# Patient Record
Sex: MALE | Race: BLACK OR AFRICAN AMERICAN | NOT HISPANIC OR LATINO
[De-identification: names, ages, dates, MRNs, and addresses within clinical notes are randomized per-mention and may not be internally consistent; named-entity substitution may affect disease eponyms.]

---

## 2019-07-29 ENCOUNTER — TRANSCRIPTION ENCOUNTER (OUTPATIENT)
Age: 19
End: 2019-07-29

## 2019-07-30 ENCOUNTER — EMERGENCY (EMERGENCY)
Facility: HOSPITAL | Age: 19
LOS: 1 days | Discharge: HOME | End: 2019-07-30
Attending: EMERGENCY MEDICINE
Payer: SELF-PAY

## 2019-07-30 VITALS
SYSTOLIC BLOOD PRESSURE: 108 MMHG | OXYGEN SATURATION: 98 % | DIASTOLIC BLOOD PRESSURE: 67 MMHG | HEART RATE: 64 BPM | RESPIRATION RATE: 18 BRPM

## 2019-07-30 VITALS
RESPIRATION RATE: 18 BRPM | SYSTOLIC BLOOD PRESSURE: 121 MMHG | DIASTOLIC BLOOD PRESSURE: 81 MMHG | TEMPERATURE: 98 F | OXYGEN SATURATION: 99 % | HEIGHT: 74 IN | WEIGHT: 184.97 LBS | HEART RATE: 62 BPM

## 2019-07-30 PROBLEM — Z00.00 ENCOUNTER FOR PREVENTIVE HEALTH EXAMINATION: Status: ACTIVE | Noted: 2019-07-30

## 2019-07-30 PROCEDURE — 12011 RPR F/E/E/N/L/M 2.5 CM/<: CPT

## 2019-07-30 PROCEDURE — 12052 INTMD RPR FACE/MM 2.6-5.0 CM: CPT | Mod: XU

## 2019-07-30 PROCEDURE — 99284 EMERGENCY DEPT VISIT MOD MDM: CPT

## 2019-07-30 PROCEDURE — 99284 EMERGENCY DEPT VISIT MOD MDM: CPT | Mod: 25

## 2019-07-30 PROCEDURE — 99053 MED SERV 10PM-8AM 24 HR FAC: CPT

## 2019-07-30 PROCEDURE — 70486 CT MAXILLOFACIAL W/O DYE: CPT | Mod: 26

## 2019-07-30 PROCEDURE — 76377 3D RENDER W/INTRP POSTPROCES: CPT

## 2019-07-30 PROCEDURE — 73000 X-RAY EXAM OF COLLAR BONE: CPT | Mod: 26,RT

## 2019-07-30 PROCEDURE — 76377 3D RENDER W/INTRP POSTPROCES: CPT | Mod: 26

## 2019-07-30 PROCEDURE — 70486 CT MAXILLOFACIAL W/O DYE: CPT

## 2019-07-30 PROCEDURE — 73000 X-RAY EXAM OF COLLAR BONE: CPT

## 2019-07-30 RX ORDER — ACETAMINOPHEN 500 MG
975 TABLET ORAL ONCE
Refills: 0 | Status: COMPLETED | OUTPATIENT
Start: 2019-07-30 | End: 2019-07-30

## 2019-07-30 RX ADMIN — Medication 975 MILLIGRAM(S): at 04:01
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
## 2019-07-30 NOTE — PROGRESS NOTE ADULT - SUBJECTIVE AND OBJECTIVE BOX
right facial and upper lip wounds  Washed out  Glass removed  RBA of repair reviewed including retained foreign bodies which may need removal at a later date  Tolerated repair [facility] Tuesday  [phone]

## 2019-07-30 NOTE — ED ADULT NURSE REASSESSMENT NOTE - NS ED NURSE REASSESS COMMENT FT1
Patient a&ox3, no acute distress, resp nonlabored, resting comfortably in bed with family at bedside. Denies headache, dizziness, chest pain, palpitations, SOB, abd pain, N/V/D, urinary symptoms, fevers, chills, weakness at this time. gauze changed and replaced on patients facial laceration, awaiting plastics. Pt placed in position of comfort. Pt educated on call bell system and provided call bell. Bed in lowest position, wheels locked, appropriate side rails raised. Pt denies needs at this time.

## 2019-07-30 NOTE — ED PROVIDER NOTE - PROGRESS NOTE DETAILS
Nicole, PGY3: Called General Plastic Surgery Attending on call, Dr. Turner's answering service. Left message requesting callback and consultation for repair of large facial laceration. Awaiting callback.

## 2019-07-30 NOTE — ED PROVIDER NOTE - CARE PROVIDER_API CALL
Mike Turner (MD)  Plastic Surgery; Surgery; Surgical Critical Care  [address] [place] [address]  Phone: [phone]  Fax: [phone]  Follow Up Time: 4-6 Days
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
## 2019-07-30 NOTE — ED ADULT NURSE REASSESSMENT NOTE - NS ED NURSE REASSESS COMMENT FT1
Plastic surgery in room with patient for debridement and sutures. Patient tolerating well, family at bedside. denies chest pain, HA, N/V/D, abdominal pain, weakness, dizziness, numbness, tinging. Peripheral pulses present b/l. Skin warm, dry and pink. Pt placed in position of comfort. Pt educated on call bell system and provided call bell. Bed in lowest position, wheels locked, appropriate side rails raised. Pt denies needs at this time.
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
## 2019-07-30 NOTE — ED PROVIDER NOTE - MUSCULOSKELETAL, MLM
Spine appears normal, range of motion is not limited, no midline spine pain. No pain w/ ranging of the neck. No shoulder, arm, chest, rib, pelvis, hip, leg, knee, ankle pain. +TTP over the R clavicle.

## 2019-07-30 NOTE — ED PROVIDER NOTE - ATTENDING CONTRIBUTION TO CARE
Attending MD Diana Newman:  I personally have seen and examined this patient.  Resident note reviewed and agree on plan of care and except where noted.  See HPI, PE, and MDM for details.
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
## 2019-07-30 NOTE — ED PROVIDER NOTE - PHYSICAL EXAMINATION
Attending Diana Newman: Gen: NAD, heent: atrauamtic, eomi, perrla, mmm, op pink, uvula midline, neck; nttp, no nuchal rigidity, chest: nttp, no crepitus, cv: rrr, no murmurs, lungs: ctab, abd: soft, nontender, nondistended, no peritoneal signs, +BS, no guarding, ext: wwp, neg homans, skin: stellate gaping laceration to left cheek, neuro: awake and alert, following commands, speech clear, sensation and strength intact, no focal deficits